# Patient Record
(demographics unavailable — no encounter records)

---

## 2025-04-14 NOTE — HISTORY OF PRESENT ILLNESS
[TextBox_4] : Patient came today for follow-up send using machine every night getting benefit also he do dream at night Denied cough wheezing or shortness of breath Been complaining recently of postnasal drip and sinus congestion Patient non-smoker never smoked for

## 2025-04-14 NOTE — PLAN
[TextEntry] : patient was counseled to use the machine every night at least 4 hrs  also counseled for weight loss and exercise New supplies ordered Told the patient to send the SD card from his machine to the vendor and then couple of weeks later to call me so we can contact the vendor to get the compliance report Start azelastine at night for postnasal drip